# Patient Record
(demographics unavailable — no encounter records)

---

## 2024-11-27 NOTE — PHYSICAL EXAM
[de-identified] : Examination of the [right] wrist reveals discomfort with compression at the level of the volar carpal tunnel eliciting numbness/tingling throughout the fingertips. Examination at the level of the [right] wrist reveals tenderness at the level of the first dorsal compartment with a positive finkelstein Examination of the hand(s) particularly at the A1 of the [right middle] reveals tenderness with a palpable click. [de-identified] : [4] views of [bilateral hands and wrists] were reviewed today in my office and were seen by me and discussed with the patient. These are grossly unremarkable.

## 2024-11-27 NOTE — HISTORY OF PRESENT ILLNESS
[Right] : right hand dominant [FreeTextEntry1] : Tessa is a 44-year-old female who presents today for follow up with chronic exacerbated right wrist and finger discomfort as well as numbness and tingling. The symptoms are bothersome during both daytime and nighttime. She also reports difficulty with activities that involve pinch and . The symptoms are affecting her ADLs. She reports tremendous relief with her injections last visit, however symptoms have returned.

## 2024-11-27 NOTE — ASSESSMENT
[FreeTextEntry1] : ASSESSMENT: The patient comes in today for follow up with chronic exacerbated right wrist and finger discomfort as well as numbness and tingling. The symptoms are bothersome during both daytime and nighttime. She also reports difficulty with activities that involve pinch and . The symptoms are affecting her ADLs. Her symptoms are consistent with right carpal tunnel syndrome, right middle trigger finger, and right de Quervain's tenosynovitis. Treatment modalities were discussed, the patient elects for repeat injections once again as they have been very beneficial.  Activity modifications and bracing discussed once again as well.  The patient was adequately and thoroughly informed of my assessment of their current condition(s).  - This may diminish bodily function for the extremity.  We discussed prognosis, treatment modalities including operative and nonoperative options for the above diagnostic assessment. As always, 2nd opinion is always provided as an option. For this, when accessible, I was able to review other physicians note(s) including reviewing other tests, imaging results as well as personally view these results for my own interpretation.      Injection:   The risks and benefits of a steroid injection were discussed in detail. The risks include but are not limited to: pain, infection, swelling, flare response, bleeding, subcutaneous fat atrophy, skin depigmentation and/or elevation of blood sugar. The risk of incomplete resolution of symptoms, recurrence and additional intervention was reviewed and considered by the patient.  The patient agreed to proceed and under a sterile prep, I injected 1 unit (6mg) into 1 cc of a combination of Celestone and Lidocaine into the [right carpal tunnel, right middle trigger, right de Quervain's]. The patient tolerated the injection well.   The patient was adequately and thoroughly informed of my assessment of their current condition(s).   DISCUSSION: 1.  Injections as above.  Activity modifications.  Follow-up in 2 months. 2. [x] 3. [x]

## 2025-03-24 NOTE — HISTORY OF PRESENT ILLNESS
[FreeTextEntry1] : Tessa is a 44-year-old female who presents today with a chronic history of right wrist and hand discomfort.  Describes episodes of difficulty gripping and lifting as well as stiffness and triggering.  Atraumatic.  Denies numbness tingling

## 2025-03-24 NOTE — PHYSICAL EXAM
[de-identified] : Examination of the [right] wrist reveals tenderness at the level of the first dorsal compartment with a positive finkelstein's examination Examination of the hand(s)  particularly at the A1 of the right middle reveals tenderness with a palpable click. [de-identified] : 4] views of [bilateral hands and wrists] were reviewed today in my office and were seen by me and discussed with the patient.  These negative

## 2025-03-24 NOTE — ASSESSMENT
[FreeTextEntry1] : ASSESSMENT: The patient comes in today with chronic exacerbated complaints of right wrist and hand discomfort.  We have discussed tendinopathy of the wrist and fingers.  With this in mind we have discussed treatment options thoroughly and today patient does elect for injections.  We discussed risk of recurrence   The patient was adequately and thoroughly informed of my assessment of their current condition(s).  - This may diminish bodily function for the extremity. We discussed prognosis, tx modalities including operative and nonoperative options for the above diagnostic assessment. As always, 2nd opinion is always provided as an option.  When accessible, I was able to review other physicians note(s) including reviewing other tests, imaging results as well as personally view these results for my own interpretation.   Injection:   The risks and benefits of a steroid injection were discussed in detail. The risks include but are not limited to: pain, infection, swelling, flare response, bleeding, subcutaneous fat atrophy, skin depigmentation and/or elevation of blood sugar. The risk of incomplete resolution of symptoms, recurrence and additional intervention was reviewed and considered by the patient. The patient agreed to proceed and under a sterile prep, I injected 1 unit 6mg into 1 cc of a combination of Celestone and Lidocaine into the right first dorsal compartment, right middle trigger. The patient tolerated the injection well.  The patient was adequately and thoroughly informed of my assessment of their current condition(s).  DISCUSSION: 1.  Injections as above.  Activity modification.  Risk of recurrence discussed 2. [x] 3. [x]